# Patient Record
Sex: FEMALE
[De-identification: names, ages, dates, MRNs, and addresses within clinical notes are randomized per-mention and may not be internally consistent; named-entity substitution may affect disease eponyms.]

---

## 2022-12-01 ENCOUNTER — APPOINTMENT (OUTPATIENT)
Dept: OBGYN | Facility: CLINIC | Age: 42
End: 2022-12-01

## 2022-12-01 VITALS
HEIGHT: 60 IN | BODY MASS INDEX: 30.82 KG/M2 | WEIGHT: 157 LBS | SYSTOLIC BLOOD PRESSURE: 120 MMHG | DIASTOLIC BLOOD PRESSURE: 70 MMHG

## 2022-12-01 DIAGNOSIS — Z78.9 OTHER SPECIFIED HEALTH STATUS: ICD-10-CM

## 2022-12-01 PROBLEM — Z00.00 ENCOUNTER FOR PREVENTIVE HEALTH EXAMINATION: Status: ACTIVE | Noted: 2022-12-01

## 2022-12-01 PROCEDURE — 99204 OFFICE O/P NEW MOD 45 MIN: CPT

## 2022-12-01 RX ORDER — PNV NO.95/FERROUS FUM/FOLIC AC 28MG-0.8MG
TABLET ORAL
Refills: 0 | Status: ACTIVE | COMMUNITY

## 2022-12-05 PROBLEM — Z78.9 NEVER EXERCISES: Status: ACTIVE | Noted: 2022-12-01

## 2022-12-30 ENCOUNTER — NON-APPOINTMENT (OUTPATIENT)
Age: 42
End: 2022-12-30

## 2023-01-02 LAB — SARS-COV-2 N GENE NPH QL NAA+PROBE: NOT DETECTED

## 2023-01-03 ENCOUNTER — APPOINTMENT (OUTPATIENT)
Dept: OBGYN | Facility: CLINIC | Age: 43
End: 2023-01-03
Payer: COMMERCIAL

## 2023-01-03 VITALS
SYSTOLIC BLOOD PRESSURE: 135 MMHG | DIASTOLIC BLOOD PRESSURE: 70 MMHG | WEIGHT: 157 LBS | TEMPERATURE: 97.8 F | BODY MASS INDEX: 30.82 KG/M2 | HEIGHT: 60 IN

## 2023-01-03 DIAGNOSIS — Z01.818 ENCOUNTER FOR OTHER PREPROCEDURAL EXAMINATION: ICD-10-CM

## 2023-01-03 DIAGNOSIS — R87.613 HIGH GRADE SQUAMOUS INTRAEPITHELIAL LESION ON CYTOLOGIC SMEAR OF CERVIX (HGSIL): ICD-10-CM

## 2023-01-03 PROCEDURE — 57461 CONZ OF CERVIX W/SCOPE LEEP: CPT | Mod: 59

## 2023-01-03 PROCEDURE — 58558Z: CUSTOM

## 2023-01-03 NOTE — END OF VISIT
[FreeTextEntry3] : I, Candace Benoit solely acted as scribe for Dr. Adelaida Munoz on 01/03/2023 \par All medical entries made by the Scribe were at my, Dr. Munoz’s, direction and personally\par dictated by me on 01/03/2023 . I have reviewed the chart and agree that the record\par accurately reflects my personal performance of the history, physical exam, assessment and plan. I\par have also personally directed, reviewed, and agreed with the chart.

## 2023-01-03 NOTE — HISTORY OF PRESENT ILLNESS
[N] : Patient reports normal menses [Tubal Occlusion] : has had a tubal occlusion [Y] : Positive pregnancy history [Menarche Age: ____] : age at menarche was [unfilled] [No] : Patient does not have concerns regarding sex [Currently Active] : currently active [Mammogramdate] : 08/17/22 [TextBox_19] : BR 2  [Papeardate] : 09/08/22 [TextBox_31] : HSIL  [HPVDate] : 09/08/22 [TextBox_78] : POS  [LMPDate] : 12/30/22 [PGHxTotal] : 3 [[facility]Term] : 3 [[facility] Centeriving] : 3 [FreeTextEntry1] : 12/30/22

## 2023-01-04 ENCOUNTER — NON-APPOINTMENT (OUTPATIENT)
Age: 43
End: 2023-01-04

## 2023-02-01 ENCOUNTER — APPOINTMENT (OUTPATIENT)
Dept: OBGYN | Facility: CLINIC | Age: 43
End: 2023-02-01
Payer: COMMERCIAL

## 2023-02-01 ENCOUNTER — APPOINTMENT (OUTPATIENT)
Dept: ANTEPARTUM | Facility: CLINIC | Age: 43
End: 2023-02-01
Payer: COMMERCIAL

## 2023-02-01 ENCOUNTER — ASOB RESULT (OUTPATIENT)
Age: 43
End: 2023-02-01

## 2023-02-01 VITALS
DIASTOLIC BLOOD PRESSURE: 82 MMHG | HEIGHT: 60 IN | SYSTOLIC BLOOD PRESSURE: 119 MMHG | WEIGHT: 157 LBS | BODY MASS INDEX: 30.82 KG/M2

## 2023-02-01 DIAGNOSIS — N87.1 MODERATE CERVICAL DYSPLASIA: ICD-10-CM

## 2023-02-01 LAB — CORE LAB BIOPSY: NORMAL

## 2023-02-01 PROCEDURE — 76830 TRANSVAGINAL US NON-OB: CPT

## 2023-02-01 PROCEDURE — 99213 OFFICE O/P EST LOW 20 MIN: CPT

## 2023-02-02 ENCOUNTER — APPOINTMENT (OUTPATIENT)
Dept: OBGYN | Facility: CLINIC | Age: 43
End: 2023-02-02

## 2023-02-02 ENCOUNTER — APPOINTMENT (OUTPATIENT)
Dept: ANTEPARTUM | Facility: CLINIC | Age: 43
End: 2023-02-02

## 2023-02-02 NOTE — DISCUSSION/SUMMARY
[FreeTextEntry1] : Transvaginal sonogram results reviewed and were wnl, however a thickened endo was noted. D&C results reviewed ans benign. Repeat imaging not currently recommended. 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 Patient aware to return in six months for a colposcopy procedure secondary to her LEEP results.\par \par F/u for colpo in six months or as needed.

## 2023-02-02 NOTE — END OF VISIT
[FreeTextEntry3] : I, Candace Benoit solely acted as scribe for Dr. Adelaida Munoz on 02/01/2023 \par All medical entries made by the Scribe were at my, Dr. Munoz’s, direction and personally\par dictated by me on 02/01/2023 . I have reviewed the chart and agree that the record\par accurately reflects my personal performance of the history, physical exam, assessment and plan. I\par have also personally directed, reviewed, and agreed with the chart.

## 2023-02-02 NOTE — HISTORY OF PRESENT ILLNESS
[N] : Patient reports normal menses [Tubal Occlusion] : has had a tubal occlusion [Y] : Positive pregnancy history [Menarche Age: ____] : age at menarche was [unfilled] [No] : Patient does not have concerns regarding sex [Mammogramdate] : 08/17/22 [TextBox_19] : BR1 [Papeardate] : 09/08/22 [TextBox_31] : HSIL [HPVDate] : 09/08/22 [TextBox_78] : POS [LMPDate] : 01/03/23 [PGHxTotal] : 3 [[facility] ChildrenlTerm] : 3 [[facility] Centeriving] : 3 [FreeTextEntry1] : 01/03/23

## 2023-08-02 ENCOUNTER — APPOINTMENT (OUTPATIENT)
Dept: OBGYN | Facility: CLINIC | Age: 43
End: 2023-08-02
Payer: COMMERCIAL

## 2023-08-02 VITALS
SYSTOLIC BLOOD PRESSURE: 122 MMHG | DIASTOLIC BLOOD PRESSURE: 70 MMHG | HEIGHT: 60 IN | WEIGHT: 160 LBS | BODY MASS INDEX: 31.41 KG/M2

## 2023-08-02 DIAGNOSIS — R87.619 UNSPECIFIED ABNORMAL CYTOLOGICAL FINDINGS IN SPECIMENS FROM CERVIX UTERI: ICD-10-CM

## 2023-08-02 DIAGNOSIS — D06.9 CARCINOMA IN SITU OF CERVIX, UNSPECIFIED: ICD-10-CM

## 2023-08-02 LAB — HCG UR QL: NEGATIVE

## 2023-08-02 PROCEDURE — 57456 ENDOCERV CURETTAGE W/SCOPE: CPT

## 2023-08-02 PROCEDURE — 81025 URINE PREGNANCY TEST: CPT

## 2023-08-02 NOTE — END OF VISIT
[FreeTextEntry3] : I, Candace Benoit solely acted as scribe for Dr. Adelaida Munoz on 08/02/2023  All medical entries made by the Scribe were at my, Dr. Silvestre, direction and personally dictated by me on 08/02/2023 . I have reviewed the chart and agree that the record accurately reflects my personal performance of the history, physical exam, assessment and plan. I have also personally directed, reviewed, and agreed with the chart.

## 2023-08-02 NOTE — DISCUSSION/SUMMARY
[FreeTextEntry1] : Colposcopy was done today. Cervical biopsy not collected. ECC performed. Patient tolerated the procedure well with no complications. Post-procedure instructions were given. Patient expressed understanding. All questions were answered.  F/u pap in six months or as needed.

## 2023-08-02 NOTE — HISTORY OF PRESENT ILLNESS
[N] : Patient does not use contraception [Y] : Patient is sexually active [Menarche Age: ____] : age at menarche was [unfilled] [No] : Patient does not have concerns regarding sex [Men] : men [Vaginal] : vaginal [TextBox_4] : COLPOSCOPY  [Mammogramdate] : 8/17/22 [TextBox_19] : br 1 [PapSmeardate] : 1/2023 [TextBox_31] : hsil hpv pos 9/2022 [HPVDate] : 9/202 [TextBox_78] : positive [LMPDate] : 7/14/23 [PGHxTotal] : 3 [[facility] HospitallTerm] : 3 [[facility] Centeriving] : 3 [TextBox_6] : 7/14/23 [TextBox_9] : 14 [FreeTextEntry1] : 7/14/23

## 2023-08-02 NOTE — PROCEDURE
[Colposcopy] : Colposcopy  [Consent Obtained] : Consent obtained [Risks] : risks [Benefits] : benefits [Patient] : patient [Infection] : infection [Bleeding] : bleeding [Allergic Reaction] : allergic reaction [Colposcopy Adequate] : colposcopy adequate [SCI Fully Visualized] : SCI fully visualized [ECC Performed] : ECC performed [Hemostasis Obtained] : Hemostasis obtained [Tolerated Well] : the patient tolerated the procedure well [Biopsy] : biopsy not taken [de-identified] : LEEP for VU 3 6 months prior [de-identified] : 0 [de-identified] : Will call with colpo results.   Likely benign.  Repeat pap in six months with annual.

## 2023-08-21 LAB — CORE LAB BIOPSY: NORMAL

## 2023-08-22 ENCOUNTER — NON-APPOINTMENT (OUTPATIENT)
Age: 43
End: 2023-08-22

## 2024-02-06 ENCOUNTER — APPOINTMENT (OUTPATIENT)
Dept: OBGYN | Facility: CLINIC | Age: 44
End: 2024-02-06
Payer: COMMERCIAL

## 2024-02-06 VITALS
BODY MASS INDEX: 34.16 KG/M2 | HEIGHT: 60 IN | WEIGHT: 174 LBS | SYSTOLIC BLOOD PRESSURE: 110 MMHG | DIASTOLIC BLOOD PRESSURE: 62 MMHG

## 2024-02-06 DIAGNOSIS — Z01.419 ENCOUNTER FOR GYNECOLOGICAL EXAMINATION (GENERAL) (ROUTINE) W/OUT ABNORMAL FINDINGS: ICD-10-CM

## 2024-02-06 DIAGNOSIS — Z12.31 ENCOUNTER FOR SCREENING MAMMOGRAM FOR MALIGNANT NEOPLASM OF BREAST: ICD-10-CM

## 2024-02-06 DIAGNOSIS — R92.30 DENSE BREASTS, UNSPECIFIED: ICD-10-CM

## 2024-02-06 DIAGNOSIS — Z01.411 ENCOUNTER FOR GYNECOLOGICAL EXAMINATION (GENERAL) (ROUTINE) WITH ABNORMAL FINDINGS: ICD-10-CM

## 2024-02-06 DIAGNOSIS — E78.5 HYPERLIPIDEMIA, UNSPECIFIED: ICD-10-CM

## 2024-02-06 DIAGNOSIS — Z98.890 OTHER SPECIFIED POSTPROCEDURAL STATES: ICD-10-CM

## 2024-02-06 PROCEDURE — 99396 PREV VISIT EST AGE 40-64: CPT

## 2024-02-06 NOTE — HISTORY OF PRESENT ILLNESS
[N] : Patient reports normal menses [Y] : Positive pregnancy history [Menarche Age: ____] : age at menarche was [unfilled] [Currently Active] : currently active [Mammogramdate] : 08/17/22 [TextBox_19] : BR1 [Papeardate] : 09/08/22 [TextBox_31] : HSL [GonorrheaDate] : 09/08/22 [TextBox_63] : NEG [ChlamydiaDate] : 09/08/22 [TextBox_68] : NEG [TrichomonasDate] : 09/08/22 [TextBox_73] : NEG [HPVDate] : 09/08/22 [TextBox_78] : POS [LMPDate] : 01/10/24 [PGHxTotal] : 3 [[facility] Children'slTerm] : 3 [[facility]iving] : 3 [FreeTextEntry1] : 01/10/24 [Yes] : Yes

## 2024-02-06 NOTE — END OF VISIT
[FreeTextEntry3] : I, Wayne Bowens, solely acted as scribe for Dr. Adelaida Munoz on 02/06/2024. All medical entries made by the Scribe were at my, Dr. Munoz's, direction and personally dictated by me on 02/06/2024. I have reviewed the chart and agree that the record accurately reflects my personal performance of the history, physical exam, assessment and plan. I have also personally directed, reviewed, and agreed with the chart.

## 2024-02-06 NOTE — DISCUSSION/SUMMARY
[FreeTextEntry1] : Pap done today.  Declines full STI testing. H/O tubal ligation for BC.  Self-breast exam reviewed.  She will follow up annually and as needed.

## 2024-02-06 NOTE — PHYSICAL EXAM
[Appropriately responsive] : appropriately responsive [Alert] : alert [No Acute Distress] : no acute distress [Soft] : soft [Non-tender] : non-tender [Non-distended] : non-distended [Oriented x3] : oriented x3 [Examination Of The Breasts] : a normal appearance [No Discharge] : no discharge [No Masses] : no breast masses were palpable [Labia Majora] : normal [Labia Minora] : normal [No Bleeding] : There was no active vaginal bleeding [Uterine Adnexae] : normal [Chaperone Present] : A chaperone was present in the examining room during all aspects of the physical examination [Normal] : normal

## 2024-02-07 LAB — HPV HIGH+LOW RISK DNA PNL CVX: NOT DETECTED

## 2024-02-10 LAB — CYTOLOGY CVX/VAG DOC THIN PREP: ABNORMAL

## 2024-02-15 ENCOUNTER — APPOINTMENT (OUTPATIENT)
Dept: OBGYN | Facility: CLINIC | Age: 44
End: 2024-02-15

## 2024-02-15 VITALS
DIASTOLIC BLOOD PRESSURE: 74 MMHG | WEIGHT: 174 LBS | HEIGHT: 60 IN | SYSTOLIC BLOOD PRESSURE: 114 MMHG | BODY MASS INDEX: 34.16 KG/M2 | TEMPERATURE: 98 F

## 2024-02-25 NOTE — HISTORY OF PRESENT ILLNESS
[Y] : Positive pregnancy history [Menarche Age: ____] : age at menarche was [unfilled] [No] : Patient does not have concerns regarding sex [Currently Active] : currently active [Men] : men [Mammogramdate] : 08/17/22 [TextBox_19] : BR1 [PapSmeardate] : 02/06/24 [TextBox_31] : WNL [HPVDate] : 02/06/24 [TextBox_78] : NEG [LMPDate] : 02/08/24 [de-identified] : had a Tubal Ligation [[facility] HospitallTerm] : 3 [PGHxTotal] : 3 [FreeTextEntry1] : 02/08/24 [[facility] Maryvaleiving] : 3